# Patient Record
Sex: MALE
[De-identification: names, ages, dates, MRNs, and addresses within clinical notes are randomized per-mention and may not be internally consistent; named-entity substitution may affect disease eponyms.]

---

## 2018-12-14 ENCOUNTER — HOSPITAL ENCOUNTER (INPATIENT)
Dept: HOSPITAL 42 - ED | Age: 58
LOS: 2 days | Discharge: HOME | DRG: 885 | End: 2018-12-16
Attending: PSYCHIATRY & NEUROLOGY | Admitting: PSYCHIATRY & NEUROLOGY
Payer: COMMERCIAL

## 2018-12-14 VITALS — OXYGEN SATURATION: 99 %

## 2018-12-14 DIAGNOSIS — E03.9: ICD-10-CM

## 2018-12-14 DIAGNOSIS — F42.9: ICD-10-CM

## 2018-12-14 DIAGNOSIS — G47.00: ICD-10-CM

## 2018-12-14 DIAGNOSIS — E78.5: ICD-10-CM

## 2018-12-14 DIAGNOSIS — F32.2: Primary | ICD-10-CM

## 2018-12-14 LAB
ALBUMIN SERPL-MCNC: 4.2 G/DL (ref 3–4.8)
ALBUMIN/GLOB SERPL: 1.3 {RATIO} (ref 1.1–1.8)
ALT SERPL-CCNC: 83 U/L (ref 7–56)
APAP SERPL-MCNC: < 10 UG/ML (ref 10–20)
APPEARANCE UR: (no result)
AST SERPL-CCNC: 61 U/L (ref 17–59)
BASOPHILS # BLD AUTO: 0.06 K/MM3 (ref 0–2)
BASOPHILS NFR BLD: 1 % (ref 0–3)
BILIRUB UR-MCNC: NEGATIVE MG/DL
BUN SERPL-MCNC: 22 MG/DL (ref 7–21)
CALCIUM SERPL-MCNC: 9.5 MG/DL (ref 8.4–10.5)
COLOR UR: YELLOW
EOSINOPHIL # BLD: 0.1 10*3/UL (ref 0–0.7)
EOSINOPHIL NFR BLD: 1.7 % (ref 1.5–5)
ERYTHROCYTE [DISTWIDTH] IN BLOOD BY AUTOMATED COUNT: 13.4 % (ref 11.5–14.5)
GFR NON-AFRICAN AMERICAN: > 60
GLUCOSE UR STRIP-MCNC: NEGATIVE MG/DL
GRANULOCYTES # BLD: 4.07 10*3/UL (ref 1.4–6.5)
GRANULOCYTES NFR BLD: 71.1 % (ref 50–68)
HGB BLD-MCNC: 13.3 G/DL (ref 14–18)
LEUKOCYTE ESTERASE UR-ACNC: NEGATIVE LEU/UL
LYMPHOCYTES # BLD: 0.9 10*3/UL (ref 1.2–3.4)
LYMPHOCYTES NFR BLD AUTO: 15.2 % (ref 22–35)
MCH RBC QN AUTO: 28.6 PG (ref 25–35)
MCHC RBC AUTO-ENTMCNC: 32.7 G/DL (ref 31–37)
MCV RBC AUTO: 87.5 FL (ref 80–105)
MONOCYTES # BLD AUTO: 0.6 10*3/UL (ref 0.1–0.6)
MONOCYTES NFR BLD: 11 % (ref 1–6)
PH UR STRIP: 8 [PH] (ref 4.7–8)
PLATELET # BLD: 230 10^3/UL (ref 120–450)
PMV BLD AUTO: 10.2 FL (ref 7–11)
PROT UR STRIP-MCNC: NEGATIVE MG/DL
RBC # BLD AUTO: 4.65 10^6/UL (ref 3.5–6.1)
RBC # UR STRIP: NEGATIVE /UL
SALICYLATES SERPL-MCNC: < 1 MG/DL (ref 2–20)
SP GR UR STRIP: 1.01 (ref 1–1.03)
UROBILINOGEN UR STRIP-ACNC: 0.2 E.U./DL
WBC # BLD AUTO: 5.7 10^3/UL (ref 4.5–11)

## 2018-12-14 NOTE — PCM.BM
<Jerad Douglas - Last Filed: 12/14/18 16:34>





Treatment Plan Problems





- Problems identified on initial assessmt


  ** HELPLESSNESS/[id number]


Date Initiated: 12/14/18


Time Initiated: 16:19


Assessment reference: HP, NA


Status: Active





  ** INEFFECTIVE COPING


Date Initiated: 12/14/18


Time Initiated: 16:23


Assessment reference: HP, NA


Status: Active





  ** SOCIAL ISOLATION


Date Initiated: 12/14/18


Time Initiated: 16:23 (\)


Assessment reference: HP, Other


Status: Active





Treatment assets and liabiliti


Patient Assests: cooperative, educated, insightful, self-reliant, physically 

healthy, cognitively intact


Patient Liabilities: live alone, relationship conflicts





- Milieu Protocol


Maintain good personal hygiene: daily Encourage regular showers, daily Remind 

patient to perform daily oral care, daily Assist patient to perform ADL's


Maintain personal safety: daily Educate patient to report safety concerns to 

staff, daily Monitor environment for contraband/sharps


Medication safety: Monitor for expected outcome, potential side effects: daily, 

Assess barriers to learning: daily, Assess readiness for medication education: 

daily





Discharge/Continuing Care





- Education Needs


Education Needs: Patient Medication, Patient Diagnosis/Disease Process, Patient 

Placement options, Patient Community resources, Patient Activities of Daily 

Living, Patient Uses of Medical Equipment, Patient Health Practices/Safety, 

Patient Personal Hygiene/Grooming, Patient Aftercare Safety Plan





- Discharge


Discharge Criteria: Free of Suicidal thoughts, Free of Homicidal thoughts, Free 

of paranoid thoughts, Free of agitation, Normal sleep pattern, Ability to care 

for self


Discharge to:: Home





<Darline Nguyen - Last Filed: 12/18/18 12:27>





- Diagnosis


(1) OCD (obsessive compulsive disorder)


Status: Acute   


Interventions: 





12/14/18 18:02


Psychoeducation


Psychopharmacology/adjustment of medications as needed/ monitoring possible side

effects


Evaluate pt on daily basis


Compliance with medications and follow up appointments


Suicide and homicide risk assessment and prevention, coping strategies, safety 

plan


Relapse prevention


Reduction of symptoms


Improve functional status


Family involvement


CBT, SSRI, exposure response prevention as outpatient


Supportive therapy


12/14/18 18:03


possible ECT








(2) Depression


Status: Acute   


Interventions: 





12/18/18 12:25


Psychoeducation


Psychopharmacology/adjustment of medications as needed/ monitoring possible side

effects


Evaluate pt on daily basis


Compliance with medications and follow up appointments


Suicide and homicide risk assessment and prevention


Relapse prevention


Reduction of symptoms


Improve functional status


Family involvement


ECT treatment


As outpatient: cognitive behavioral therapy

## 2018-12-14 NOTE — RAD
HISTORY:

 pes eval 



COMPARISON:

None available. 



TECHNIQUE:

Chest, one view.



FINDINGS:





LUNGS:

Hyperinflation may be seen in the setting of COPD.  Left hilar 

prominence.  No focal consolidation.



Please note that chest x-ray has limited sensitivity for the 

detection of pulmonary masses.



PLEURA:

No significant pleural effusion identified. No definite pneumothorax .



CARDIOVASCULAR:

Heart size appears within normal limits.  No significant 

atherosclerotic calcification present. 



OSSEOUS STRUCTURES:

Degenerative changes of the spine.



VISUALIZED UPPER ABDOMEN:

Unremarkable.



OTHER FINDINGS:

None.



IMPRESSION:

Hyperinflation may be seen in setting of COPD.  No focal 

consolidation.  Left hilar prominence.

## 2018-12-14 NOTE — ED PDOC
Arrival/HPI





- General


Chief Complaint: Psychiatric Evaluation


Time Seen by Provider: 12/14/18 12:00


Historian: Patient





- History of Present Illness


Narrative History of Present Illness (Text): 





12/14/18 13:23


58yr old male with a history of depression presents today sent in by his primary

care physician for psychiatric admission. Patient denies suicidal or homicidal 

ideations but states his depression has been worsening. Patient denies chest 

pain or shortness of breath. No abdominal pain. No dizziness or weakness. Denies

any urinary symptoms but states he has been having difficulty get an erection. 

Patient denies any other complaints.





Past Medical History





- Provider Review


Nursing Documentation Reviewed: Yes





- Travel History


Have you recently traveled outside [place] w/in the past 3 mons?: No





- Infectious Disease


Hx of Infectious Diseases: None





- Psychiatric


Hx Depression: Yes


Hx Substance Use: No





- Surgical History


Hx Appendectomy: Yes





- Anesthesia


Hx Anesthesia: Yes


Hx Anesthesia Reactions: No


Hx Malignant Hyperthermia: No





Family/Social History





- Physician Review


Nursing Documentation Reviewed: Yes


Family/Social History: Unknown Family HX


Smoking Status: Never Smoked


Hx Alcohol Use: No


Hx Substance Use: No





Allergies/Home Meds


Allergies/Adverse Reactions: 


Allergies





No Known Allergies Allergy (Verified 12/14/18 12:11)


   








Home Medications: 


                                    Home Meds











 Medication  Instructions  Recorded  Confirmed


 


Atorvastatin [Lipitor] 1 tab PO HS 12/14/18 12/14/18


 


LORazepam [Ativan] 1 tab PO DAILY PRN 12/14/18 12/14/18


 


Lamotrigine [Lamictal] 1 tab PO BID 12/14/18 12/14/18


 


Memantine [Namenda] 1 tab PO DAILY 12/14/18 12/14/18


 


Mirtazapine [Remeron] 1 tab PO HS 12/14/18 12/14/18


 


buPROPion XL [Wellbutrin XL] 1 tab PO DAILY 12/14/18 12/14/18














Review of Systems





- Review of Systems


Constitutional: absent: Fatigue, Fevers


Respiratory: absent: SOB, Cough


Cardiovascular: absent: Chest Pain, Palpitations


Gastrointestinal: absent: Abdominal Pain, Nausea, Vomiting


Genitourinary Male: absent: Dysuria, Frequency, Hematuria


Musculoskeletal: absent: Arthralgias, Back Pain, Neck Pain


Skin: absent: Rash, Pruritis


Neurological: absent: Headache, Dizziness


Psychiatric: Anxiety, Depression.  absent: Suicidal Ideation





Physical Exam


Vital Signs Reviewed: Yes





Vital Signs











  Temp Pulse Resp BP Pulse Ox


 


 12/14/18 12:05  98.7 F  85  19  129/85  98











Temperature: Afebrile


Blood Pressure: Normal


Pulse: Regular


Respiratory Rate: Normal


Appearance: Positive for: Well-Appearing, Non-Toxic, Comfortable


Pain Distress: None


Mental Status: Positive for: Alert and Oriented X 3





- Systems Exam


Head: Present: Atraumatic


Mouth: Present: Moist Mucous Membranes


Respiratory/Chest: Present: Clear to Auscultation


Cardiovascular: Present: Regular Rate and Rhythm


Abdomen: No: Tenderness


Upper Extremity: Present: Normal ROM


Lower Extremity: Present: Normal ROM


Neurological: Present: GCS=15, Speech Normal


Skin: Present: Warm, Dry, Normal Color.  No: Rashes


Psychiatric: Present: Alert, Oriented x 3, Depressed Mood





Medical Decision Making


ED Course and Treatment: 





12/14/18 14:38


Patient is nontoxic well-appearing in no distress vital signs are stable.





CBC WNL


CMP slight elevation in LFTS.





Tylenol WNL


Salicylate WNL


Alcohol level WNL





Urine drug screen wnl





UA; wnl





cxr: wnl





ekg normal sinus rhythm at 86 bpm incomplete right bundle branch block no ST 

elevations 








pt is medically cleared for PES evaluation


Patient was seen and evaluated by PES screener:  jm








pt signed voluntarily to behavioral health floor. 














Impression; depression


admit to behavioral health floor





- Lab Interpretations


Lab Results: 











                                 12/14/18 13:00 





                                   Lab Results





12/14/18 13:00: WBC 5.7, RBC 4.65, Hgb 13.3 L, Hct 40.7 L, MCV 87.5, MCH 28.6, 

MCHC 32.7, RDW 13.4, Plt Count 230, MPV 10.2, Gran % 71.1 H, Lymph % (Auto) 15.2

L, Mono % (Auto) 11.0 H, Eos % (Auto) 1.7, Baso % (Auto) 1.0, Gran # 4.07, Lymph

# (Auto) 0.9 L, Mono # (Auto) 0.6, Eos # (Auto) 0.1, Baso # (Auto) 0.06











Disposition/Present on Arrival





- Present on Arrival


Any Indicators Present on Arrival: No


History of DVT/PE: No


History of Uncontrolled Diabetes: No


Urinary Catheter: No


History of Decub. Ulcer: No


History Surgical Site Infection Following: None





- Disposition


Have Diagnosis and Disposition been Completed?: Yes


Diagnosis: 


 Depression





Disposition: HOSPITALIZED


Disposition Time: 13:50


Patient Plan: Admission


Condition: FAIR
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
## 2018-12-14 NOTE — CARD
--------------- APPROVED REPORT --------------





Date of service: 12/14/2018



EKG Measurement

Heart [id number]

MD [id number]

[id number]

[id number]

[id number]



<Conclusion>

Normal sinus rhythm

Incomplete right bundle branch block

Abnormal ECG

## 2018-12-15 VITALS — DIASTOLIC BLOOD PRESSURE: 75 MMHG | HEART RATE: 81 BPM | SYSTOLIC BLOOD PRESSURE: 114 MMHG | TEMPERATURE: 97.2 F

## 2018-12-15 VITALS — RESPIRATION RATE: 20 BRPM

## 2018-12-15 LAB
HDLC SERPL-MCNC: 80 MG/DL (ref 29–60)
LDLC SERPL-MCNC: 112 MG/DL (ref 0–129)
T4 FREE SERPL-MCNC: 0.84 NG/DL (ref 0.78–2.19)

## 2018-12-15 RX ADMIN — MULTIPLE VITAMINS W/ MINERALS TAB SCH TAB: TAB at 08:50

## 2018-12-15 RX ADMIN — Medication SCH MG: at 08:50

## 2018-12-15 NOTE — PCM.PYCHPN
Psychiatric Progress Note





- Psychiatric Progress Note


Patient seen today, length of contact: 25 min


Problems Identified/Issues Discussed: 


I reviewed assessment and recent notes. Patient was quietly interviewed while we

took a walk around the unit. He is alert and well-oriented to circumstances. His

focus and judgment are good. He can communicate his needs succinctly without 

much repetition, blocking, vagueness or overinclusiveness. Patient confirms that

he has been more depressed. He admits to multiple bouts of self-resolving 

depression while prescribed amitriptyline for about 30 years however most recent

bout this year just seemed to worsen. It appears that amitriptyline became less 

effective for his mood and anxiety symptoms. This is also in context of his 

mother's passing x2.5 years ago~this death was difficult for him 


Patient is aware of the indications of all his prior and current medications 

however he is concerned by the number of medications his outpatient psychiatrist

has been prescribing him. He doesn't feel these medications have been 

beneficial. Thus far he is tolerating the medication changes enacted by Dr. Nguyen though still feels depressed and anxious.  He is not suicidal and 

denies ever having those thoughts. Remains hopeful about improving. 


Regarding ECT, patient is ambivalent about this intervention at this time. 


 


Diagnostic Results: 


Major depressive disorder severe no psychosis


Rule out obsessive-compulsive disorder








Medication Change: Yes (Decreased lamictal)


Medical Record Reviewed: Yes





Mental Status Examination





- Cognitive Function


Orientation: Person, Place, Situation, Time


Attention: WNL


Concentration: WNL


Association: WN


Fund of Knowledge: WNL





- Mood


Mood: Depressed, Anxious





- Affect


Affect: Constricted (And tearful), Flat





- Formal Thought Process


Formal Thought Process: No Impairment





- Suicidal Ideation


Suicidal Ideation: No





- Homicidal Ideation


Homicidal Ideation: No





Goal/Treatment Plan





- Goal/Treatment Plan


Progress Toward Problem(s) and Goals/Treatment Plan: 





 


* Milieu/structure/supportive therapy 


* Communicated with Dr. Nguyen on 12/15/18 regarding patient's progress and

  treatment options. 


* Medical consult requested for possible ECT clearance





* Continue with following medications:


* Lamotrigine 175 mg po daily decreased to 100 mg po daily  


* Lorazepam 0.5 mg po bid prn: anxiety


* Remeron at increased dose of 30 mg at the nighttime for depression and 

  insomnia~patient is tolerating well


* wellbutrin 300mg po XL po qAM


 


* Vitals reviewed and noted below:


                                                                Selected Entries











  12/14/18 12/14/18





  12:05 15:23


 


Temperature 98.7 F 98.3 F


 


Pulse Rate 85 65


 


Respiratory 19 18





Rate  


 


Blood Pressure 129/85 118/74


 


O2 Sat by Pulse 98 99





Oximetry  


 


Oxygen Delivery  Room Air





Method  











* New weekend lab results noted below:


                                                                                











  12/15/18 12/15/18





  07:00 07:00


 


Fasting Glucose  93 


 


Triglycerides  79 


 


Cholesterol  212 H 


 


LDL Cholesterol Direct  112 


 


HDL Cholesterol  80 H 


 


Free T4   0.84


 


TSH 3rd Generation   4.98 H

## 2018-12-16 RX ADMIN — Medication SCH MG: at 10:01

## 2018-12-16 RX ADMIN — MULTIPLE VITAMINS W/ MINERALS TAB SCH TAB: TAB at 10:01
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
